# Patient Record
Sex: FEMALE | Race: OTHER | URBAN - METROPOLITAN AREA
[De-identification: names, ages, dates, MRNs, and addresses within clinical notes are randomized per-mention and may not be internally consistent; named-entity substitution may affect disease eponyms.]

---

## 2017-08-14 ENCOUNTER — APPOINTMENT (RX ONLY)
Dept: URBAN - METROPOLITAN AREA CLINIC 54 | Facility: CLINIC | Age: 47
Setting detail: DERMATOLOGY
End: 2017-08-14

## 2017-08-14 DIAGNOSIS — L73.9 FOLLICULAR DISORDER, UNSPECIFIED: ICD-10-CM

## 2017-08-14 DIAGNOSIS — L738 OTHER SPECIFIED DISEASES OF HAIR AND HAIR FOLLICLES: ICD-10-CM

## 2017-08-14 DIAGNOSIS — D485 NEOPLASM OF UNCERTAIN BEHAVIOR OF SKIN: ICD-10-CM

## 2017-08-14 DIAGNOSIS — D18.0 HEMANGIOMA: ICD-10-CM

## 2017-08-14 DIAGNOSIS — L663 OTHER SPECIFIED DISEASES OF HAIR AND HAIR FOLLICLES: ICD-10-CM

## 2017-08-14 PROBLEM — I63.50 CEREBRAL INFARCTION DUE TO UNSPECIFIED OCCLUSION OR STENOSIS OF UNSPECIFIED CEREBRAL ARTERY: Status: ACTIVE | Noted: 2017-08-14

## 2017-08-14 PROBLEM — D48.5 NEOPLASM OF UNCERTAIN BEHAVIOR OF SKIN: Status: ACTIVE | Noted: 2017-08-14

## 2017-08-14 PROBLEM — L02.426 FURUNCLE OF LEFT LOWER LIMB: Status: ACTIVE | Noted: 2017-08-14

## 2017-08-14 PROBLEM — D18.01 HEMANGIOMA OF SKIN AND SUBCUTANEOUS TISSUE: Status: ACTIVE | Noted: 2017-08-14

## 2017-08-14 PROBLEM — L02.425 FURUNCLE OF RIGHT LOWER LIMB: Status: ACTIVE | Noted: 2017-08-14

## 2017-08-14 PROCEDURE — 99203 OFFICE O/P NEW LOW 30 MIN: CPT

## 2017-08-14 PROCEDURE — ? TREATMENT REGIMEN

## 2017-08-14 PROCEDURE — ? SEPARATE AND IDENTIFIABLE DOCUMENTATION

## 2017-08-14 PROCEDURE — ? PRESCRIPTION

## 2017-08-14 PROCEDURE — ? DEFER

## 2017-08-14 PROCEDURE — ? COUNSELING

## 2017-08-14 RX ORDER — CLINDAMYCIN PHOSPHATE 10 MG/ML
SOLUTION TOPICAL
Qty: 1 | Refills: 1 | Status: ERX | COMMUNITY
Start: 2017-08-14

## 2017-08-14 RX ADMIN — CLINDAMYCIN PHOSPHATE: 10 SOLUTION TOPICAL at 00:00

## 2017-08-14 ASSESSMENT — LOCATION DETAILED DESCRIPTION DERM
LOCATION DETAILED: LEFT RADIAL DORSAL HAND
LOCATION DETAILED: LEFT DORSAL WRIST
LOCATION DETAILED: RIGHT PROXIMAL POSTERIOR THIGH
LOCATION DETAILED: LEFT DISTAL POSTERIOR THIGH
LOCATION DETAILED: RIGHT DISTAL POSTERIOR THIGH
LOCATION DETAILED: LEFT PROXIMAL POSTERIOR UPPER ARM
LOCATION DETAILED: LEFT PROXIMAL POSTERIOR THIGH
LOCATION DETAILED: LEFT POSTERIOR SHOULDER

## 2017-08-14 ASSESSMENT — LOCATION SIMPLE DESCRIPTION DERM
LOCATION SIMPLE: LEFT WRIST
LOCATION SIMPLE: LEFT HAND
LOCATION SIMPLE: LEFT SHOULDER
LOCATION SIMPLE: LEFT POSTERIOR THIGH
LOCATION SIMPLE: RIGHT POSTERIOR THIGH
LOCATION SIMPLE: LEFT POSTERIOR UPPER ARM

## 2017-08-14 ASSESSMENT — LOCATION ZONE DERM
LOCATION ZONE: ARM
LOCATION ZONE: LEG
LOCATION ZONE: ARM
LOCATION ZONE: HAND

## 2017-08-14 NOTE — PROCEDURE: DEFER
Instructions (Optional): A. Right dorsal hand, size: .7x.5cm R/o SCC(irregular brown papule). B. Right forearm, size: .8x.6cm R/o SCC( irregular brown papule).  C. Right medial heal, size: 1.5x1.2cm( dark brown patch)
Procedure To Be Performed At Next Visit: Biopsy by shave method
Detail Level: Detailed

## 2017-08-14 NOTE — HPI: SKIN LESIONS
Have Your Skin Lesions Been Treated?: not been treated
Is This A New Presentation, Or A Follow-Up?: Skin Lesions
How Severe Is Your Skin Lesion?: moderate
Additional History: Patient states that skin lesions have been progressing throughout the years.

## 2017-08-14 NOTE — PROCEDURE: MIPS QUALITY
Quality 431: Preventive Care And Screening: Unhealthy Alcohol Use - Screening: Patient screened for unhealthy alcohol use using a single question and scores less than 2 times per year
Quality 130: Documentation Of Current Medications In The Medical Record: Current Medications Documented
Detail Level: Zone
Quality 402: Tobacco Use And Help With Quitting Among Adolescents: Patient screened for tobacco and never smoked

## 2017-08-28 ENCOUNTER — APPOINTMENT (RX ONLY)
Dept: URBAN - METROPOLITAN AREA CLINIC 54 | Facility: CLINIC | Age: 47
Setting detail: DERMATOLOGY
End: 2017-08-28

## 2017-08-28 DIAGNOSIS — D485 NEOPLASM OF UNCERTAIN BEHAVIOR OF SKIN: ICD-10-CM

## 2017-08-28 PROBLEM — D48.5 NEOPLASM OF UNCERTAIN BEHAVIOR OF SKIN: Status: ACTIVE | Noted: 2017-08-28

## 2017-08-28 PROCEDURE — 99213 OFFICE O/P EST LOW 20 MIN: CPT | Mod: 25

## 2017-08-28 PROCEDURE — ? BIOPSY BY SHAVE METHOD

## 2017-08-28 PROCEDURE — ? SEPARATE AND IDENTIFIABLE DOCUMENTATION

## 2017-08-28 PROCEDURE — 11101: CPT

## 2017-08-28 PROCEDURE — 11100: CPT

## 2017-08-28 PROCEDURE — ? COUNSELING

## 2017-08-28 ASSESSMENT — LOCATION ZONE DERM
LOCATION ZONE: ARM
LOCATION ZONE: HAND
LOCATION ZONE: FEET

## 2017-08-28 ASSESSMENT — LOCATION DETAILED DESCRIPTION DERM
LOCATION DETAILED: RIGHT RADIAL DORSAL HAND
LOCATION DETAILED: LEFT MEDIAL DORSAL FOOT
LOCATION DETAILED: RIGHT DISTAL RADIAL DORSAL FOREARM

## 2017-08-28 ASSESSMENT — LOCATION SIMPLE DESCRIPTION DERM
LOCATION SIMPLE: LEFT FOOT
LOCATION SIMPLE: RIGHT HAND
LOCATION SIMPLE: RIGHT FOREARM

## 2017-08-28 ASSESSMENT — PAIN INTENSITY VAS: HOW INTENSE IS YOUR PAIN 0 BEING NO PAIN, 10 BEING THE MOST SEVERE PAIN POSSIBLE?: NO PAIN

## 2017-08-28 NOTE — PROCEDURE: BIOPSY BY SHAVE METHOD
Notification Instructions: Patient will be notified of biopsy results. However, patient instructed to call the office if not contacted within 2 weeks.
Hemostasis: Electrocautery
Biopsy Method: Personna blade
Additional Anesthesia Volume In Cc (Will Not Render If 0): 0
Lab: [id number]
Detail Level: Detailed
Size Of Lesion In Cm: 0.7
Render Post-Care Instructions In Note?: no
Billing Type: [employer]
Path Notes (To The Dermatopathologist): Irregular Brown Papule: R/O- SCC, 0.7x0.5cm
X Size Of Lesion In Cm: 0.5
Anesthesia Volume In Cc (Will Not Render If 0): 0.4
Post-Care Instructions: I reviewed with the patient in detail post-care instructions. Patient is to keep the biopsy site dry overnight, and then apply bacitracin twice daily until healed. Patient may apply hydrogen peroxide soaks to remove any crusting.
Dressing: bandage
Anesthesia Type: 1% lidocaine with epinephrine
Wound Care: Bacitracin
Consent: Written consent was obtained and risks were reviewed including but not limited to scarring, infection, bleeding, scabbing, incomplete removal, nerve damage and allergy to anesthesia.
Type Of Destruction Used: Curettage
Lab Facility: [address] [place] [address]
Biopsy Type: H and E
Body Location Override (Optional - Billing Will Still Be Based On Selected Body Map Location If Applicable): Right Dorsal Hand
Lab: [id number]
Size Of Lesion In Cm: 0.8
Body Location Override (Optional - Billing Will Still Be Based On Selected Body Map Location If Applicable): Right Forearm
Lab Facility: [id number]
X Size Of Lesion In Cm: 0.6
Billing Type: Third-Party Bill
Path Notes (To The Dermatopathologist): Irregular Brown Papule: R/O- SCC, 0.8x0.6cm
Size Of Lesion In Cm: 1.5
X Size Of Lesion In Cm: 1.2
Path Notes (To The Dermatopathologist): Dark brown patch: R/O- SCC, 1.5x1.2cm
Body Location Override (Optional - Billing Will Still Be Based On Selected Body Map Location If Applicable): Right Medial Heel

## 2017-09-25 ENCOUNTER — APPOINTMENT (RX ONLY)
Dept: URBAN - METROPOLITAN AREA CLINIC 54 | Facility: CLINIC | Age: 47
Setting detail: DERMATOLOGY
End: 2017-09-25

## 2017-09-25 DIAGNOSIS — D22 MELANOCYTIC NEVI: ICD-10-CM

## 2017-09-25 DIAGNOSIS — Z48.817 ENCOUNTER FOR SURGICAL AFTERCARE FOLLOWING SURGERY ON THE SKIN AND SUBCUTANEOUS TISSUE: ICD-10-CM

## 2017-09-25 DIAGNOSIS — L82.0 INFLAMED SEBORRHEIC KERATOSIS: ICD-10-CM

## 2017-09-25 PROBLEM — D22.9 MELANOCYTIC NEVI, UNSPECIFIED: Status: ACTIVE | Noted: 2017-09-25

## 2017-09-25 PROCEDURE — ? POST-OP WOUND EVALUATION

## 2017-09-25 PROCEDURE — ? PATHOLOGY DISCUSSION

## 2017-09-25 PROCEDURE — ? COUNSELING

## 2017-09-25 PROCEDURE — ? PRESCRIPTION

## 2017-09-25 PROCEDURE — 99213 OFFICE O/P EST LOW 20 MIN: CPT

## 2017-09-25 ASSESSMENT — LOCATION SIMPLE DESCRIPTION DERM
LOCATION SIMPLE: RIGHT HAND
LOCATION SIMPLE: RIGHT ANKLE

## 2017-09-25 ASSESSMENT — LOCATION DETAILED DESCRIPTION DERM
LOCATION DETAILED: RIGHT RADIAL DORSAL HAND
LOCATION DETAILED: RIGHT ANTERIOR MEDIAL MALLEOLUS

## 2017-09-25 ASSESSMENT — LOCATION ZONE DERM
LOCATION ZONE: LEG
LOCATION ZONE: HAND

## 2017-09-25 NOTE — PROCEDURE: POST-OP WOUND EVALUATION
Patient To Follow-Up With?: our clinic
Add 86953 Cpt? (Important Note: In 2017 The Use Of 94916 Is Being Tracked By Cms To Determine Future Global Period Reimbursement For Global Periods): no
Wound Edema?: mild
Wound Color?: pink
Detail Level: Detailed
Wound Crusting?: clean
Follow Up Units (Optional): 0
Wound Evaluated By (Optional): [facility] [employer]

## 2017-09-26 RX ORDER — MUPIROCIN 20 MG/G
OINTMENT TOPICAL
Qty: 1 | Refills: 1 | Status: ERX | COMMUNITY
Start: 2017-09-26

## 2017-09-26 RX ADMIN — MUPIROCIN: 20 OINTMENT TOPICAL at 00:00

## 2018-01-23 ENCOUNTER — APPOINTMENT (RX ONLY)
Dept: URBAN - METROPOLITAN AREA CLINIC 54 | Facility: CLINIC | Age: 48
Setting detail: DERMATOLOGY
End: 2018-01-23

## 2018-01-23 DIAGNOSIS — L91.0 HYPERTROPHIC SCAR: ICD-10-CM

## 2018-01-23 DIAGNOSIS — L81.0 POSTINFLAMMATORY HYPERPIGMENTATION: ICD-10-CM

## 2018-01-23 DIAGNOSIS — L82.0 INFLAMED SEBORRHEIC KERATOSIS: ICD-10-CM

## 2018-01-23 PROBLEM — I10 ESSENTIAL (PRIMARY) HYPERTENSION: Status: ACTIVE | Noted: 2018-01-23

## 2018-01-23 PROCEDURE — ? DEFER

## 2018-01-23 PROCEDURE — ? COUNSELING

## 2018-01-23 PROCEDURE — 99213 OFFICE O/P EST LOW 20 MIN: CPT

## 2018-01-23 PROCEDURE — ? SEPARATE AND IDENTIFIABLE DOCUMENTATION

## 2018-01-23 ASSESSMENT — LOCATION DETAILED DESCRIPTION DERM
LOCATION DETAILED: RIGHT ACHILLES SKIN
LOCATION DETAILED: RIGHT SUPERIOR MEDIAL LOWER BACK
LOCATION DETAILED: RIGHT INFERIOR UPPER BACK
LOCATION DETAILED: LEFT SUPERIOR MEDIAL MIDBACK
LOCATION DETAILED: RIGHT ULNAR DORSAL HAND
LOCATION DETAILED: LEFT ULNAR DORSAL HAND
LOCATION DETAILED: RIGHT MEDIAL SUPERIOR CHEST
LOCATION DETAILED: RIGHT ACHILLES SKIN
LOCATION DETAILED: LEFT CLAVICULAR SKIN
LOCATION DETAILED: MIDDLE STERNUM
LOCATION DETAILED: RIGHT SUPERIOR MEDIAL LOWER BACK
LOCATION DETAILED: SUPERIOR THORACIC SPINE
LOCATION DETAILED: LEFT SUPERIOR LATERAL MIDBACK
LOCATION DETAILED: LEFT CLAVICULAR SKIN
LOCATION DETAILED: LEFT ULNAR DORSAL HAND
LOCATION DETAILED: RIGHT INFERIOR UPPER BACK
LOCATION DETAILED: RIGHT ULNAR DORSAL HAND
LOCATION DETAILED: SUPERIOR THORACIC SPINE
LOCATION DETAILED: RIGHT MEDIAL SUPERIOR CHEST
LOCATION DETAILED: LEFT MEDIAL SUPERIOR CHEST

## 2018-01-23 ASSESSMENT — LOCATION SIMPLE DESCRIPTION DERM
LOCATION SIMPLE: LEFT CLAVICULAR SKIN
LOCATION SIMPLE: RIGHT ACHILLES SKIN
LOCATION SIMPLE: RIGHT LOWER BACK
LOCATION SIMPLE: UPPER BACK
LOCATION SIMPLE: RIGHT HAND
LOCATION SIMPLE: RIGHT HAND
LOCATION SIMPLE: CHEST
LOCATION SIMPLE: LEFT HAND
LOCATION SIMPLE: LEFT LOWER BACK
LOCATION SIMPLE: LEFT HAND
LOCATION SIMPLE: RIGHT LOWER BACK
LOCATION SIMPLE: RIGHT UPPER BACK
LOCATION SIMPLE: CHEST
LOCATION SIMPLE: LEFT CLAVICULAR SKIN
LOCATION SIMPLE: LEFT LOWER BACK
LOCATION SIMPLE: RIGHT ACHILLES SKIN
LOCATION SIMPLE: UPPER BACK
LOCATION SIMPLE: RIGHT UPPER BACK

## 2018-01-23 ASSESSMENT — LOCATION ZONE DERM
LOCATION ZONE: TRUNK
LOCATION ZONE: LEG
LOCATION ZONE: LEG
LOCATION ZONE: HAND
LOCATION ZONE: TRUNK
LOCATION ZONE: HAND

## 2018-01-23 NOTE — PROCEDURE: DEFER
Detail Level: Detailed
Procedure To Be Performed At Next Visit: Intralesional Kenalog
Procedure To Be Performed At Next Visit: Cryotherapy

## 2022-12-21 NOTE — PROCEDURE: COUNSELING
Detail Level: Detailed
Detail Level: Zone
Melolabial Interpolation Flap Text: A decision was made to reconstruct the defect utilizing an interpolation axial flap and a staged reconstruction.  A telfa template was made of the defect.  This telfa template was then used to outline the melolabial interpolation flap.  The donor area for the pedicle flap was then injected with anesthesia.  The flap was excised through the skin and subcutaneous tissue down to the layer of the underlying musculature.  The pedicle flap was carefully excised within this deep plane to maintain its blood supply.  The edges of the donor site were undermined.   The donor site was closed in a primary fashion.  The pedicle was then rotated into position and sutured.  Once the tube was sutured into place, adequate blood supply was confirmed with blanching and refill.  The pedicle was then wrapped with xeroform gauze and dressed appropriately with a telfa and gauze bandage to ensure continued blood supply and protect the attached pedicle.

## 2025-06-06 NOTE — PROCEDURE: PATHOLOGY DISCUSSION
1-2x/week
Detail Level: Detailed
Quality 265: Biopsy Follow-Up: Biopsy results reviewed, communicated, tracked, and documented